# Patient Record
Sex: MALE | Race: WHITE | Employment: FULL TIME | URBAN - METROPOLITAN AREA
[De-identification: names, ages, dates, MRNs, and addresses within clinical notes are randomized per-mention and may not be internally consistent; named-entity substitution may affect disease eponyms.]

---

## 2018-03-10 ENCOUNTER — HOSPITAL ENCOUNTER (EMERGENCY)
Age: 38
Discharge: HOME | End: 2018-03-10
Attending: EMERGENCY MEDICINE
Payer: COMMERCIAL

## 2018-03-10 ENCOUNTER — APPOINTMENT (OUTPATIENT)
Dept: CT IMAGING | Age: 38
End: 2018-03-10
Attending: PHYSICIAN ASSISTANT
Payer: COMMERCIAL

## 2018-03-10 VITALS
RESPIRATION RATE: 16 BRPM | OXYGEN SATURATION: 98 % | HEART RATE: 94 BPM | HEIGHT: 68 IN | BODY MASS INDEX: 28.63 KG/M2 | WEIGHT: 188.93 LBS | SYSTOLIC BLOOD PRESSURE: 152 MMHG | DIASTOLIC BLOOD PRESSURE: 85 MMHG | TEMPERATURE: 98.2 F

## 2018-03-10 DIAGNOSIS — M54.41 ACUTE BACK PAIN WITH SCIATICA, RIGHT: ICD-10-CM

## 2018-03-10 DIAGNOSIS — M47.816 ARTHRITIS, LUMBAR SPINE: Primary | ICD-10-CM

## 2018-03-10 PROCEDURE — 74011250637 HC RX REV CODE- 250/637: Performed by: PHYSICIAN ASSISTANT

## 2018-03-10 PROCEDURE — 99283 EMERGENCY DEPT VISIT LOW MDM: CPT

## 2018-03-10 PROCEDURE — 72131 CT LUMBAR SPINE W/O DYE: CPT

## 2018-03-10 RX ORDER — ACETAMINOPHEN 325 MG/1
650 TABLET ORAL
Status: COMPLETED | OUTPATIENT
Start: 2018-03-10 | End: 2018-03-10

## 2018-03-10 RX ORDER — PREDNISONE 5 MG/1
TABLET ORAL SEE ADMIN INSTRUCTIONS
COMMUNITY

## 2018-03-10 RX ORDER — ACETAMINOPHEN AND CODEINE PHOSPHATE 300; 30 MG/1; MG/1
1 TABLET ORAL
Qty: 8 TAB | Refills: 0 | Status: SHIPPED | OUTPATIENT
Start: 2018-03-10

## 2018-03-10 RX ORDER — NAPROXEN 500 MG/1
500 TABLET ORAL
Qty: 20 TAB | Refills: 0 | Status: SHIPPED | OUTPATIENT
Start: 2018-03-10

## 2018-03-10 RX ORDER — IBUPROFEN 400 MG/1
800 TABLET ORAL
Status: COMPLETED | OUTPATIENT
Start: 2018-03-10 | End: 2018-03-10

## 2018-03-10 RX ADMIN — ACETAMINOPHEN 650 MG: 325 TABLET ORAL at 11:35

## 2018-03-10 RX ADMIN — IBUPROFEN 800 MG: 400 TABLET, FILM COATED ORAL at 11:35

## 2018-03-10 NOTE — ED PROVIDER NOTES
EMERGENCY DEPARTMENT HISTORY AND PHYSICAL EXAM      Date: 3/10/2018  Patient Name: Amairani De La Garza    History of Presenting Illness     Chief Complaint   Patient presents with    Back Pain     Onset lower back pain after driving 2 days here from [facility] 1 week ago. Pain now radiates down right leg. History Provided By: Patient    HPI: Amairani De La Garza, 44 y.o. male with PSHx significant for double fusion of L4/L5 and L5/S1 in 2015, presents ambulatory to the ED with cc of gradual onset, constant, \"excruiating\" low back pain with associated right leg pain, weakness, and tingling that began on 3/4/2018. Pt notes that he moved from [address] [place] [address] to [place] 9 days ago. He drove for 2 days, after which he felt a \"soreness\" in his back. As pt was moving in, he put his right foot out to catch a heavy door, \"yanked\" his foot, and fell onto his back. Four hours later, pt began to feel pain from his lower back to his right calf with tingling and weakness. He states that his current pain is worse than his pain prior to his surgery. Pt was evaluated at [facility] in [address] [place] [address] and was prescribed Tylenol and Skelaxin. He then visited [facility], where Zehra Hercules was negative and he was d/c with dx of sciatica, rx for prednisone, Tramadol, tizanidine after a shot of Toradol. He is at [facility] ED due to concern for a lack of resources at smaller hospitals/[facility]. Pt is seeing his PCP on 3/13/2018. He does not have any allergies to medication. Pt specifically denies nausea, vomiting, fever, chills, urinary incontinence, dysuria, or saddle anesthesia. PCP: None    Social Hx: - Tobacco, + EtOH, - Illicit Drugs    There are no other complaints, changes, or physical findings at this time. Past History     Past Medical History:  History reviewed. No pertinent past medical history. Past Surgical History:  Past Surgical History:   Procedure Laterality Date    HX ORTHOPAEDIC      double spinal fusion       Family History:  History reviewed. No pertinent family history. Social History:  Social History   Substance Use Topics    Smoking status: Never Smoker    Smokeless tobacco: Current User    Alcohol use Yes      Comment: socially     Allergies:  No Known Allergies    Review of Systems   Review of Systems   Constitutional: Negative for chills and fever. HENT: Negative for congestion, rhinorrhea, sneezing and sore throat. Eyes: Negative for redness and visual disturbance. Respiratory: Negative for shortness of breath. Cardiovascular: Negative for chest pain and leg swelling. Gastrointestinal: Negative for abdominal pain, nausea and vomiting. Genitourinary: Negative for difficulty urinating and frequency. - incontinence  - aviva anesthesia    Musculoskeletal: Positive for back pain (lower). Negative for myalgias and neck stiffness. + right leg pain   Skin: Negative for rash. Neurological: Positive for weakness (right leg). Negative for dizziness, syncope and headaches.        + tingling right leg   Hematological: Negative for adenopathy. All other systems reviewed and are negative. Physical Exam   Physical Exam   Constitutional: He is oriented to person, place, and time. He appears well-developed and well-nourished. HENT:   Head: Normocephalic and atraumatic. Mouth/Throat: Oropharynx is clear and moist and mucous membranes are normal.   Eyes: EOM are normal.   Neck: Normal range of motion and full passive range of motion without pain. Neck supple. Cardiovascular: Normal rate, regular rhythm, normal heart sounds, intact distal pulses and normal pulses. No murmur heard. 2+ distal pulses bilaterally    Pulmonary/Chest: Effort normal and breath sounds normal. No respiratory distress. He exhibits no tenderness. Abdominal: Soft. Normal appearance and bowel sounds are normal. There is no tenderness. There is no rebound and no guarding. Musculoskeletal: He exhibits tenderness.    Moderate right paravertebral muscle tenderness to the thoracic and lumbar region   Spinal tenderness to palpation to the lumbar region  Limited ROM due to pain  No swelling, deformity, or redness    3/5 strength of plantar and dorsiflexion of right foot  4/4 strength of left foot    Neurological: He is alert and oriented to person, place, and time. He has normal strength. Skin: Skin is warm, dry and intact. No rash noted. No erythema. Psychiatric: He has a normal mood and affect. His speech is normal and behavior is normal. Judgment and thought content normal.   Nursing note and vitals reviewed. Diagnostic Study Results     Labs -   No results found for this or any previous visit (from the past 12 hour(s)). Radiologic Studies -     CT Results  (Last 48 hours)               03/10/18 1158  CT SPINE LUMB WO CONT Final result    Impression:  IMPRESSION: Mild degenerative disease. Narrative:  CT LUMBAR SPINE WITHOUT CONTRAST:  3/10/2018 11:58 AM       INDICATION: Low back pain, prior lumbar surgery . COMPARISON: None. TECHNIQUE: Multislice helical CT of the lumbar spine was performed without   contrast. Sagittal and coronal reformats were generated. CT dose reduction was   achieved through use of a standardized protocol tailored for this examination   and automatic exposure control for dose modulation. FINDINGS:   Post L3-S1 posterior pedicle screw and padilla fusion, interbody disc graft   placement, and anterior screw fusion. The surgical construct is well seated. Fusion across the vertebral bodies and across the left L5-S1 facet has begun. No   fracture or dislocation. Vertebral body heights are normal. The paraspinal soft   tissues are normal.       T12-L1: No herniation or stenosis. L1-L2: No herniation or stenosis. L2-L3: No herniation or stenosis. Mild facet osteoarthritis. L3-L4: Facet osteoarthritis causes mild canal and mild bilateral neural   foraminal stenosis. L4-L5: Hardware obscures.  No overt canal or neural foraminal stenosis. L5-S1: Hardware obscures. No overt canal or neural foraminal stenosis. Medical Decision Making   I am the first provider for this patient. I reviewed the vital signs, available nursing notes, past medical history, past surgical history, family history and social history. Vital Signs-Reviewed the patient's vital signs. Patient Vitals for the past 12 hrs:   Temp Pulse Resp BP SpO2   03/10/18 1052 98.2 °F (36.8 °C) 94 16 152/85 98 %     Pulse Oximetry Analysis - 98% on RA    Cardiac Monitor:   Rate: 94 bpm  Rhythm: Normal Sinus Rhythm      Records Reviewed: Nursing Notes    Provider Notes (Medical Decision Making):   DDx: strain, sprain, fracture, dislocation, herniated disc     ED Course:   Initial assessment performed. The patients presenting problems have been discussed, and they are in agreement with the care plan formulated and outlined with them. I have encouraged them to ask questions as they arise throughout their visit. 12:30 PM  Advised pt to continue on his prescribed medications, f/u with scheduled appointment. Disposition:  Discharge Note:  12:37 PM  The patient has been re-evaluated and is ready for discharge. Reviewed available results with patient. Counseled patient/parent/guardian on diagnosis and care plan. Patient has expressed understanding, and all questions have been answered. Patient agrees with plan and agrees to follow up as recommended, or return to the ED if their symptoms worsen. Discharge instructions have been provided and explained to the patient, along with reasons to return to the ED. PLAN:  1.    Discharge Medication List as of 3/10/2018 12:32 PM      START taking these medications    Details   naproxen (NAPROSYN) 500 mg tablet Take 1 Tab by mouth two (2) times daily as needed., Normal, Disp-20 Tab, R-0         CONTINUE these medications which have NOT CHANGED    Details   predniSONE (STERAPRED) 5 mg dose pack Take by mouth See Admin Instructions. See administration instruction per 5mg dose pack, Historical Med      TIZANIDINE HCL (TIZANIDINE PO) Take  by mouth., Historical Med      TRAMADOL HCL (TRAMADOL PO) Take  by mouth., Historical Med           2. Follow-up Information     Follow up With Details Comments [address] Schedule an appointment as soon as possible for a visit in 1 week As needed, If symptoms worsen [address]   [phone] [facility] Orthopedic Surgery Schedule an appointment as soon as possible for a visit in 1 week As needed, If symptoms worsen [address] [facility]   [phone]        Return to ED if worse     Diagnosis     Clinical Impression:   1. Arthritis, lumbar spine ([address]    2. Acute back pain with sciatica, right      Attestations:    Attestation: This note is prepared by Alyce Almazan, acting as scribe for GENIA Mcdonough PA-C: The scribe's documentation has been prepared under my direction and personally reviewed by me in its entirety. I confirm that the note above accurately reflects all work, treatment, procedures, and medical decision making performed by me.

## 2018-03-10 NOTE — ED NOTES
ASPEN Moreland reviewed discharge instructions with the patient. The patient verbalized understanding. Patient discharged home with stable vitals. Patient ambulating out of ED with steady gait. No further complaints noted at this time.

## 2018-03-10 NOTE — ED NOTES
Patient reports to ED with complaints of lower back pain that radiates down to his right leg. Patient states occassionally the pain is so bad that he is unable to walk because of the weakness, tingling, and pain in his right leg. Patient reports taking tramadol 0730 without relief. No further complaints noted. Patient has a hx of a double fusion in his L4/L5 and L5 S1 vertebrae.

## 2018-03-10 NOTE — DISCHARGE INSTRUCTIONS
Sciatica: Care Instructions  Your Care Instructions    Sciatica (say \"hcv-KP-du-kuh\") is an irritation of one of the sciatic nerves, which come from the spinal cord in the lower back. The sciatic nerves and their branches extend down through the buttock to the foot. Sciatica can develop when an injured disc in the back presses against a spinal nerve root. Its main symptom is pain, numbness, or weakness that is often worse in the leg or foot than in the back. Sciatica often will improve and go away with time. Early treatment usually includes medicines and exercises to relieve pain. Follow-up care is a key part of your treatment and safety. Be sure to make and go to all appointments, and call your doctor if you are having problems. It's also a good idea to know your test results and keep a list of the medicines you take. How can you care for yourself at home? · Take pain medicines exactly as directed. ¨ If the doctor gave you a prescription medicine for pain, take it as prescribed. ¨ If you are not taking a prescription pain medicine, ask your doctor if you can take an over-the-counter medicine. · Use heat or ice to relieve pain. ¨ To apply heat, put a warm water bottle, heating pad set on low, or warm cloth on your back. Do not go to sleep with a heating pad on your skin. ¨ To use ice, put ice or a cold pack on the area for 10 to 20 minutes at a time. Put a thin cloth between the ice and your skin. · Avoid sitting if possible, unless it feels better than standing. · Alternate lying down with short walks. Increase your walking distance as you are able to without making your symptoms worse. · Do not do anything that makes your symptoms worse. When should you call for help? Call 911 anytime you think you may need emergency care. For example, call if:  · You are unable to move a leg at all.   Call your doctor now or seek immediate medical care if:  · You have new or worse symptoms in your legs or buttocks. Symptoms may include:  ¨ Numbness or tingling. ¨ Weakness. ¨ Pain. · You lose bladder or bowel control. Watch closely for changes in your health, and be sure to contact your doctor if:  · You are not getting better as expected. Where can you learn more? Go to http://geeta-veronica.info/. Enter [phone] in the search box to learn more about \"Sciatica: Care Instructions. \"  Current as of: March 21, 2017  Content Version: 11.4  © 1690-9091 [employer]. Care instructions adapted under license by [employer] (which disclaims liability or warranty for this information). If you have questions about a medical condition or this instruction, always ask your healthcare professional. [address] any warranty or liability for your use of this information.